# Patient Record
Sex: MALE | Race: WHITE | NOT HISPANIC OR LATINO | Employment: FULL TIME
[De-identification: names, ages, dates, MRNs, and addresses within clinical notes are randomized per-mention and may not be internally consistent; named-entity substitution may affect disease eponyms.]

---

## 2022-12-13 ENCOUNTER — OFFICE VISIT (OUTPATIENT)
Dept: MEDICAL GROUP | Facility: OTHER | Age: 32
End: 2022-12-13
Payer: COMMERCIAL

## 2022-12-13 VITALS
HEART RATE: 65 BPM | BODY MASS INDEX: 29.66 KG/M2 | DIASTOLIC BLOOD PRESSURE: 60 MMHG | HEIGHT: 72 IN | OXYGEN SATURATION: 95 % | TEMPERATURE: 97.7 F | SYSTOLIC BLOOD PRESSURE: 110 MMHG | WEIGHT: 219 LBS

## 2022-12-13 DIAGNOSIS — R13.19 ESOPHAGEAL DYSPHAGIA: ICD-10-CM

## 2022-12-13 DIAGNOSIS — Z00.00 HEALTHCARE MAINTENANCE: ICD-10-CM

## 2022-12-13 PROCEDURE — 99203 OFFICE O/P NEW LOW 30 MIN: CPT | Mod: GC | Performed by: FAMILY MEDICINE

## 2022-12-13 RX ORDER — CEPHALEXIN 500 MG/1
CAPSULE ORAL
COMMUNITY
Start: 2022-11-07 | End: 2022-12-13

## 2022-12-13 ASSESSMENT — PATIENT HEALTH QUESTIONNAIRE - PHQ9: CLINICAL INTERPRETATION OF PHQ2 SCORE: 0

## 2022-12-13 NOTE — PROGRESS NOTES
Subjective:   Patti Calle is a 32 y.o. male here for the evaluation and management of [facility] (Jaw surgery in 2017 and ever since then has trouble swallowing )      Problem   Esophageal Dysphagia    Reports concerns for food getting stuck in his throat.  States he had a jaw surgery back in 2017 for realignment purposes.  His jaw was wired shut for 6 weeks at follow-up.  States that when he began to eat after that, he noticed that he had to consciously chew the food completely or else he would feel like it got stuck deep in his esophagus.  Typically occurred with meats.  Inconsistent on when this would occur.  Denies acid reflux.  Denies changes in bowel function.  Denies odynophagia.     Healthcare Maintenance    Presenting to [facility].  Has not seen a physician in many years, and would like to get established.  No acute concerns at this time.  Social drinker, averaging only a few drinks per week.  Does not endorse the use of tobacco products or recreational drugs.         ROS    No current outpatient medications on file.     No current facility-administered medications for this visit.       Allergies  Patient has no allergy information on record.    History reviewed. No pertinent past medical history.    Patient Active Problem List    Diagnosis Date Noted    Esophageal dysphagia 12/13/2022    Healthcare maintenance 12/13/2022       Past Surgical History  Past Surgical History:   Procedure Laterality Date    APPENDECTOMY         Social History     Socioeconomic History    Marital status:    Tobacco Use    Smoking status: Never     Passive exposure: Never    Smokeless tobacco: Never   Vaping Use    Vaping Use: Never used   Substance and Sexual Activity    Alcohol use: Yes     Comment: 2-3 drinks per week    Drug use: Never        Objective:     Vitals:    12/13/22 0808   BP: 110/60   BP Location: Left arm   Patient Position: Sitting   Pulse: 65   Temp: 36.5 °C (97.7 °F)   SpO2: 95%   Weight: 99.3  kg (219 lb)   Height: 1.829 m (6')     Body mass index is 29.7 kg/m².     Physical Exam  Gen:  AO, NAD  ENMT: normal hearing  Cardiac:  RRR, no murmurs  Respiratory:  Lungs CTAB, no wheeze/rhonchi/rales, non-labored breathing on room air, symmetrical chest expansion  Abdomen:  Non-tender non-distended  Psychiatric:  Calm, cooperative, normal affect and mood    Assessment and Plan:   Patti Calle is a 32 y.o. male with a Establish Care (Jaw surgery in 2017 and ever since then has trouble swallowing )     The following was discussed with the patient today.    Problem List Items Addressed This Visit       Esophageal dysphagia     New problem to provider  Encourage patient to follow-up with dentist (has typically seen a dentist twice per year, but has not seen 1 in quite some time)  Will also refer to gastroenterology for further evaluation.  Difficult to assess if endoscopy with dilatation is needed at this time         Relevant Orders    Referral to Gastroenterology    Healthcare maintenance     We will proceed with some baseline labs.  Obtaining CBC, CMP, lipid profile, and A1c         Relevant Orders    CBC WITH DIFFERENTIAL    Comp Metabolic Panel    Lipid Profile    HEMOGLOBIN A1C       Followup: Return if symptoms worsen or fail to improve.    Sedrick Price M.D.    Patient seen and discussed with Dr. Paul MD.    Please note that this dictation was created using voice recognition software. I have made every reasonable attempt to correct obvious errors, but I expect that there are errors of grammar and possibly content that I did not discover before finalizing the note.

## 2022-12-13 NOTE — ASSESSMENT & PLAN NOTE
New problem to provider  Encourage patient to follow-up with dentist (has typically seen a dentist twice per year, but has not seen 1 in quite some time)  Will also refer to gastroenterology for further evaluation.  Difficult to assess if endoscopy with dilatation is needed at this time

## 2023-01-04 ENCOUNTER — HOSPITAL ENCOUNTER (OUTPATIENT)
Dept: LAB | Facility: MEDICAL CENTER | Age: 33
End: 2023-01-04
Attending: FAMILY MEDICINE
Payer: COMMERCIAL

## 2023-01-04 DIAGNOSIS — Z00.00 HEALTHCARE MAINTENANCE: ICD-10-CM

## 2023-01-04 LAB
ALBUMIN SERPL BCP-MCNC: 4.5 G/DL (ref 3.2–4.9)
ALBUMIN/GLOB SERPL: 1.6 G/DL
ALP SERPL-CCNC: 68 U/L (ref 30–99)
ALT SERPL-CCNC: 39 U/L (ref 2–50)
ANION GAP SERPL CALC-SCNC: 11 MMOL/L (ref 7–16)
AST SERPL-CCNC: 36 U/L (ref 12–45)
BASOPHILS # BLD AUTO: 0.7 % (ref 0–1.8)
BASOPHILS # BLD: 0.05 K/UL (ref 0–0.12)
BILIRUB SERPL-MCNC: 0.8 MG/DL (ref 0.1–1.5)
BUN SERPL-MCNC: 13 MG/DL (ref 8–22)
CALCIUM ALBUM COR SERPL-MCNC: 9.1 MG/DL (ref 8.5–10.5)
CALCIUM SERPL-MCNC: 9.5 MG/DL (ref 8.5–10.5)
CHLORIDE SERPL-SCNC: 103 MMOL/L (ref 96–112)
CHOLEST SERPL-MCNC: 242 MG/DL (ref 100–199)
CO2 SERPL-SCNC: 25 MMOL/L (ref 20–33)
CREAT SERPL-MCNC: 0.95 MG/DL (ref 0.5–1.4)
EOSINOPHIL # BLD AUTO: 0.33 K/UL (ref 0–0.51)
EOSINOPHIL NFR BLD: 4.7 % (ref 0–6.9)
ERYTHROCYTE [DISTWIDTH] IN BLOOD BY AUTOMATED COUNT: 40.9 FL (ref 35.9–50)
EST. AVERAGE GLUCOSE BLD GHB EST-MCNC: 114 MG/DL
GFR SERPLBLD CREATININE-BSD FMLA CKD-EPI: 110 ML/MIN/1.73 M 2
GLOBULIN SER CALC-MCNC: 2.9 G/DL (ref 1.9–3.5)
GLUCOSE SERPL-MCNC: 89 MG/DL (ref 65–99)
HBA1C MFR BLD: 5.6 % (ref 4–5.6)
HCT VFR BLD AUTO: 49.1 % (ref 42–52)
HDLC SERPL-MCNC: 49 MG/DL
HGB BLD-MCNC: 16.4 G/DL (ref 14–18)
IMM GRANULOCYTES # BLD AUTO: 0.03 K/UL (ref 0–0.11)
IMM GRANULOCYTES NFR BLD AUTO: 0.4 % (ref 0–0.9)
LDLC SERPL CALC-MCNC: 170 MG/DL
LYMPHOCYTES # BLD AUTO: 1.96 K/UL (ref 1–4.8)
LYMPHOCYTES NFR BLD: 28.1 % (ref 22–41)
MCH RBC QN AUTO: 30.1 PG (ref 27–33)
MCHC RBC AUTO-ENTMCNC: 33.4 G/DL (ref 33.7–35.3)
MCV RBC AUTO: 90.3 FL (ref 81.4–97.8)
MONOCYTES # BLD AUTO: 0.53 K/UL (ref 0–0.85)
MONOCYTES NFR BLD AUTO: 7.6 % (ref 0–13.4)
NEUTROPHILS # BLD AUTO: 4.07 K/UL (ref 1.82–7.42)
NEUTROPHILS NFR BLD: 58.5 % (ref 44–72)
NRBC # BLD AUTO: 0 K/UL
NRBC BLD-RTO: 0 /100 WBC
PLATELET # BLD AUTO: 200 K/UL (ref 164–446)
PMV BLD AUTO: 10.2 FL (ref 9–12.9)
POTASSIUM SERPL-SCNC: 4.1 MMOL/L (ref 3.6–5.5)
PROT SERPL-MCNC: 7.4 G/DL (ref 6–8.2)
RBC # BLD AUTO: 5.44 M/UL (ref 4.7–6.1)
SODIUM SERPL-SCNC: 139 MMOL/L (ref 135–145)
TRIGL SERPL-MCNC: 115 MG/DL (ref 0–149)
WBC # BLD AUTO: 7 K/UL (ref 4.8–10.8)

## 2023-01-04 PROCEDURE — 85025 COMPLETE CBC W/AUTO DIFF WBC: CPT

## 2023-01-04 PROCEDURE — 80053 COMPREHEN METABOLIC PANEL: CPT

## 2023-01-04 PROCEDURE — 36415 COLL VENOUS BLD VENIPUNCTURE: CPT

## 2023-01-04 PROCEDURE — 83036 HEMOGLOBIN GLYCOSYLATED A1C: CPT

## 2023-01-04 PROCEDURE — 80061 LIPID PANEL: CPT

## 2023-01-26 ENCOUNTER — OFFICE VISIT (OUTPATIENT)
Dept: MEDICAL GROUP | Facility: CLINIC | Age: 33
End: 2023-01-26
Payer: COMMERCIAL

## 2023-01-26 VITALS — BODY MASS INDEX: 26.03 KG/M2 | TEMPERATURE: 97.7 F | WEIGHT: 225 LBS | HEIGHT: 78 IN | RESPIRATION RATE: 16 BRPM

## 2023-01-26 DIAGNOSIS — M25.531 RIGHT WRIST PAIN: ICD-10-CM

## 2023-01-26 PROCEDURE — 99213 OFFICE O/P EST LOW 20 MIN: CPT | Mod: GE | Performed by: FAMILY MEDICINE

## 2023-01-26 RX ORDER — OMEPRAZOLE 20 MG/1
CAPSULE, DELAYED RELEASE ORAL
COMMUNITY
Start: 2023-01-16 | End: 2023-01-26

## 2023-01-26 RX ORDER — OMEPRAZOLE 20 MG/1
CAPSULE, DELAYED RELEASE ORAL
COMMUNITY
Start: 2023-01-10

## 2023-01-26 ASSESSMENT — FIBROSIS 4 INDEX: FIB4 SCORE: 0.86

## 2023-01-26 ASSESSMENT — PATIENT HEALTH QUESTIONNAIRE - PHQ9: CLINICAL INTERPRETATION OF PHQ2 SCORE: 0

## 2023-01-26 NOTE — PROGRESS NOTES
Subjective:   Attestation  Pt was discussed with the resident at the time of visit and I agree with the residents assessment and plan         Patti Calle is a 32 y.o. male here for the evaluation and management of Wrist Pain (Rt wrist, pain started middle of nov. )      Problem   Right Wrist Pain    Patient is a 32-year-old left-hand-dominant male presenting with right wrist pain.  Noticed it in November 2022 after lifting weights.  No specific trauma/inciting event.  Does have a history of bilateral wrist fractures (sounds like multiple of each wrist).  Although left-hand-dominant, still does a lot of things with his right hand.  Does a lot of typing for work.  The pain is only present when exerting himself such as with lifting weights.  Has not tried ibuprofen/Tylenol.  Has been inconsistent with therapy exercises.  Has not tried bracing.  Not better or worse at any time to the day.         ROS    Current Outpatient Medications   Medication Sig Dispense Refill    omeprazole (PRILOSEC) 20 MG delayed-release capsule TAKE 1 CAPSULE BY MOUTH EVERY DAY 30 MINUTES BEFORE BREAKFAST MEAL       No current facility-administered medications for this visit.       Allergies  Patient has no allergy information on record.    History reviewed. No pertinent past medical history.    Patient Active Problem List    Diagnosis Date Noted    Right wrist pain 01/26/2023    Esophageal dysphagia 12/13/2022    Healthcare maintenance 12/13/2022       Past Surgical History  Past Surgical History:   Procedure Laterality Date    APPENDECTOMY         Social History     Socioeconomic History    Marital status:    Tobacco Use    Smoking status: Never     Passive exposure: Never    Smokeless tobacco: Never   Vaping Use    Vaping Use: Never used   Substance and Sexual Activity    Alcohol use: Yes     Comment: 2-3 drinks per week    Drug use: Never        Objective:     Vitals:    01/26/23 0825   BP: (P) 110/68   BP Location: (P) Left  "arm   Patient Position: (P) Sitting   BP Cuff Size: (P) Adult   Pulse: (P) 68   Resp: 16   Temp: 36.5 °C (97.7 °F)   TempSrc: Temporal   SpO2: (P) 97%   Weight: 102 kg (225 lb)   Height: 1.981 m (6' 6\")     Body mass index is 26 kg/m².     Physical Exam  Gen: AO, NAD  MSK: Normal to inspection without swelling, erythema, ecchymosis or warmth. No tenderness of the distal radius, distal ulna, carpal bones, metacarpals, phalanges, or TFCC. Full active ROM of the wrist with flexion, extension, ulnar/radial deviation, supination, and pronation. 5/5 strength with resisted flexion, extension, supination, pronation. Sensation intact to light touch and radial pulse +2. Normal  strength, resisted thumbs up, okay sign, reposition/opposition testing.    Assessment and Plan:   Patti Calle is a 32 y.o. male with a Wrist Pain (Rt wrist, pain started middle of nov. )     The following was discussed with the patient today.    Problem List Items Addressed This Visit       Right wrist pain     New problem to provider, subacute in nature  Point-of-care ultrasound of the right wrist was unremarkable  Concern for vague dorsal wrist pain includes chronic tendinopathy versus occult fracture versus other  Will obtain x-ray 4 view: AP, lateral, oblique, clenched fist  Following up x-ray, will likely brace patient and work on strengthening exercises  Follow-up pending x-rays--could consider MRI down the road if exercises and brace did not provide relief         Relevant Orders    DX-WRIST-COMPLETE 3+ RIGHT       Followup: No follow-ups on file.    Sedrick Price M.D.    Discussed with Dr. Paul MD.    Please note that this dictation was created using voice recognition software. I have made every reasonable attempt to correct obvious errors, but I expect that there are errors of grammar and possibly content that I did not discover before finalizing the note.    "

## 2023-01-26 NOTE — ASSESSMENT & PLAN NOTE
New problem to provider, subacute in nature  Point-of-care ultrasound of the right wrist was unremarkable  Concern for vague dorsal wrist pain includes chronic tendinopathy versus occult fracture versus other  Will obtain x-ray 4 view: AP, lateral, oblique, clenched fist  Following up x-ray, will likely brace patient and work on strengthening exercises  Follow-up pending x-rays--could consider MRI down the road if exercises and brace did not provide relief

## 2023-01-31 ENCOUNTER — APPOINTMENT (OUTPATIENT)
Dept: RADIOLOGY | Facility: MEDICAL CENTER | Age: 33
End: 2023-01-31
Attending: FAMILY MEDICINE
Payer: COMMERCIAL

## 2023-01-31 DIAGNOSIS — M25.531 RIGHT WRIST PAIN: ICD-10-CM

## 2023-01-31 PROCEDURE — 73110 X-RAY EXAM OF WRIST: CPT | Mod: RT

## 2023-03-23 ENCOUNTER — APPOINTMENT (OUTPATIENT)
Dept: MEDICAL GROUP | Facility: CLINIC | Age: 33
End: 2023-03-23
Payer: COMMERCIAL

## 2024-12-16 DIAGNOSIS — L98.9 SKIN LESION OF FACE: ICD-10-CM

## 2024-12-16 NOTE — PROGRESS NOTES
joseph comes in with a small nevus to the rt eye - just below the orbit rim - that has grown would like to see derm

## 2025-01-24 ENCOUNTER — OFFICE VISIT (OUTPATIENT)
Dept: DERMATOLOGY | Facility: IMAGING CENTER | Age: 35
End: 2025-01-24
Payer: COMMERCIAL

## 2025-01-24 DIAGNOSIS — D22.30 NEVUS OF FACE: ICD-10-CM

## 2025-01-24 DIAGNOSIS — L82.0 INFLAMED SEBORRHEIC KERATOSIS: ICD-10-CM

## 2025-01-24 PROCEDURE — 17110 DESTRUCTION B9 LES UP TO 14: CPT | Performed by: STUDENT IN AN ORGANIZED HEALTH CARE EDUCATION/TRAINING PROGRAM

## 2025-01-24 PROCEDURE — 99203 OFFICE O/P NEW LOW 30 MIN: CPT | Mod: 25 | Performed by: STUDENT IN AN ORGANIZED HEALTH CARE EDUCATION/TRAINING PROGRAM

## 2025-01-24 NOTE — PROGRESS NOTES
[facility]    Chief Complaint   Patient presents with    Establish Care        HPI:    Patit Calle is a 34 y.o. male here for evaluation of  lesion over face x2  Has had for for the past few years. Not changing.        No other symptomatic (itching, painful, burning) or changing lesions.     History of skin cancer: No  Family history of skin cancer:Yes, Details: father unknown type        Review of Systems: No fevers, chill. Pertinent positives and negatives above.       Medications, Medical History, Surgical History, Family History & Allergies:  Reviewed in the chart, relevant history noted above.       PHYSICAL EXAM  Focused skin exam of face    - tan to erythematous scaly stuck on papule on the right cheek, 2mm  - right forehead with 2.5mm well circumscribed homogenous brown papule    ASSESSMENT & PLAN    # Inflamed seborrheic keratosis  - reassured benign, but given lesion(s) symptomatic, treatment with cryotherapy discussed and patient amenable.  CRYOTHERAPY:  Risks (including, but not limited to: hypo or hyperpigmentation, redness, blister, blood blister, recurrence, need for further treatment, infection, scar) and benefits of cryotherapy discussed. Patient verbally agreed to proceed with treatment. Cryotherapy freeze thaw cycles of 5-10 seconds were applied.  - LN2 x 1 lesion(s).  Patient tolerated procedure well. Aftercare instructions given.      # Melanotic nevi  - clinically benign appearing today  - ABCDEs of atypical appearing moles discussed.       Follow up: as needed       Beatriz East MD  [facility]

## 2025-04-29 ENCOUNTER — HOSPITAL ENCOUNTER (OUTPATIENT)
Dept: LAB | Facility: MEDICAL CENTER | Age: 35
End: 2025-04-29
Attending: FAMILY MEDICINE
Payer: COMMERCIAL

## 2025-04-29 DIAGNOSIS — B35.1 ONYCHOMYCOSIS: ICD-10-CM

## 2025-04-29 LAB
ALBUMIN SERPL BCP-MCNC: 4.4 G/DL (ref 3.2–4.9)
ALBUMIN/GLOB SERPL: 1.7 G/DL
ALP SERPL-CCNC: 68 U/L (ref 30–99)
ALT SERPL-CCNC: 24 U/L (ref 2–50)
ANION GAP SERPL CALC-SCNC: 11 MMOL/L (ref 7–16)
AST SERPL-CCNC: 35 U/L (ref 12–45)
BILIRUB SERPL-MCNC: 0.4 MG/DL (ref 0.1–1.5)
BUN SERPL-MCNC: 21 MG/DL (ref 8–22)
CALCIUM ALBUM COR SERPL-MCNC: 9.1 MG/DL (ref 8.5–10.5)
CALCIUM SERPL-MCNC: 9.4 MG/DL (ref 8.5–10.5)
CHLORIDE SERPL-SCNC: 103 MMOL/L (ref 96–112)
CO2 SERPL-SCNC: 24 MMOL/L (ref 20–33)
CREAT SERPL-MCNC: 1.33 MG/DL (ref 0.5–1.4)
GFR SERPLBLD CREATININE-BSD FMLA CKD-EPI: 73 ML/MIN/1.73 M 2
GLOBULIN SER CALC-MCNC: 2.6 G/DL (ref 1.9–3.5)
GLUCOSE SERPL-MCNC: 78 MG/DL (ref 65–99)
POTASSIUM SERPL-SCNC: 4 MMOL/L (ref 3.6–5.5)
PROT SERPL-MCNC: 7 G/DL (ref 6–8.2)
SODIUM SERPL-SCNC: 138 MMOL/L (ref 135–145)

## 2025-04-29 PROCEDURE — 36415 COLL VENOUS BLD VENIPUNCTURE: CPT

## 2025-04-29 PROCEDURE — 80053 COMPREHEN METABOLIC PANEL: CPT

## 2025-04-29 RX ORDER — TERBINAFINE HYDROCHLORIDE 250 MG/1
250 TABLET ORAL DAILY
Qty: 84 TABLET | Refills: 0 | Status: SHIPPED | OUTPATIENT
Start: 2025-04-29

## 2025-04-29 NOTE — PROGRESS NOTES
Patient has onychomycosis of his great and small toe on the right foot.  Nothing on the left foot.  No evidence of athlete's foot.  Was treated in the past with the medication that cleared it up and would like to try it again.  Ordered CMP.  If normal he can take the 84 days of terbinafine which was sent to [facility].

## 2025-04-30 ENCOUNTER — RESULTS FOLLOW-UP (OUTPATIENT)
Dept: MEDICAL GROUP | Facility: OTHER | Age: 35
End: 2025-04-30
Payer: COMMERCIAL

## 2025-05-01 NOTE — RESULT ENCOUNTER NOTE
Cmp normal. Liver enzymes normal. Kidney function normal.  OK to start the terbenafine 250mg qd for 84 days - sent to pharmacy.